# Patient Record
(demographics unavailable — no encounter records)

---

## 2024-12-16 NOTE — ASSESSMENT
[FreeTextEntry1] : called Dr. Prieto to request bw results be faxed over will do bw after review Dr. Prieto

## 2024-12-16 NOTE — HISTORY OF PRESENT ILLNESS
[FreeTextEntry1] :  f/u  [de-identified] :  anxiety - feels sertraline somewhat working but still feels anxious - no si or hi - would like to go up on sertraline  heme - interval - saw Dr. Prieto - increased to Jakafi 5mg in am and Jakafi 10mg in pm but hasn't started yet

## 2024-12-16 NOTE — HISTORY OF PRESENT ILLNESS
[FreeTextEntry1] :  f/u  [de-identified] :  anxiety - feels sertraline somewhat working but still feels anxious - no si or hi - would like to go up on sertraline  heme - interval - saw Dr. Prieto - increased to Jakafi 5mg in am and Jakafi 10mg in pm but hasn't started yet

## 2025-02-04 NOTE — HISTORY OF PRESENT ILLNESS
[FreeTextEntry1] :  depression/anxiety [de-identified] : Depression- taking sertraline 150mg daily - sometimes taking sertraline 200mg daily  no si or hi - doesn't feel like sertraline is helping - takes xanax prn anxiety  feels more down about aging  has good friends  would like to try alternative medicine lexapro didn't help  will rtc for bw

## 2025-02-04 NOTE — HISTORY OF PRESENT ILLNESS
[FreeTextEntry1] :  depression/anxiety [de-identified] : Depression- taking sertraline 150mg daily - sometimes taking sertraline 200mg daily  no si or hi - doesn't feel like sertraline is helping - takes xanax prn anxiety  feels more down about aging  has good friends  would like to try alternative medicine lexapro didn't help  will rtc for bw

## 2025-03-03 NOTE — PHYSICAL EXAM
[No Acute Distress] : no acute distress [Well Nourished] : well nourished [PERRL] : pupils equal round and reactive to light [EOMI] : extraocular movements intact [No Accessory Muscle Use] : no accessory muscle use [Clear to Auscultation] : lungs were clear to auscultation bilaterally [Regular Rhythm] : with a regular rhythm [Normal S1, S2] : normal S1 and S2 [No Murmur] : no murmur heard [No Edema] : there was no peripheral edema [Normal] : the cranial nerves were intact [Sensation Tactile Decrease] : light touch was intact [2+] : left 2+ [Normal Affect] : the affect was normal [Normal Insight/Judgement] : insight and judgment were intact

## 2025-03-03 NOTE — HISTORY OF PRESENT ILLNESS
[FreeTextEntry1] :   f/u  [de-identified] : anxiety/depression - states went up to 2 duloxetine 30mg daily but felt good so went back to 1 duloxetine 30mg daily  states in the past does have diagnosis of ADHD states "sometimes forgets what he did yesterday"   ENDO - here for f/u of elevated prolactin and elevated FSH level   has appt to see hematology tomorrow  pre-diabetes - watching diet  occ LBP with tingling in r. leg - brief episodes and resolves with PT

## 2025-03-17 NOTE — HISTORY OF PRESENT ILLNESS
[FreeTextEntry1] :  f/u  [de-identified] :  had elevated prolactin - repeat wnl  went for MRI pituitary - shows no macro-adenoma   depression/anxiety - on duloxetine 30mg daily - no si or hi  takes xanax prn    is going to see hand doctor for L. palmar ganglion cyst - in the past has had injections every 6 months  MRI pituitary noted incidental finding of 1.9 cm nasal polyp

## 2025-03-17 NOTE — PHYSICAL EXAM
[No Acute Distress] : no acute distress [Well Nourished] : well nourished [No Accessory Muscle Use] : no accessory muscle use [Clear to Auscultation] : lungs were clear to auscultation bilaterally [Regular Rhythm] : with a regular rhythm [Normal S1, S2] : normal S1 and S2 [No Murmur] : no murmur heard [No Edema] : there was no peripheral edema [Normal Affect] : the affect was normal [Normal Insight/Judgement] : insight and judgment were intact

## 2025-04-11 NOTE — PHYSICAL EXAM
[No Acute Distress] : no acute distress [Well Nourished] : well nourished [Regular Rhythm] : with a regular rhythm [Normal S1, S2] : normal S1 and S2 [No Murmur] : no murmur heard [No Edema] : there was no peripheral edema [Soft] : abdomen soft [Non Tender] : non-tender [Non-distended] : non-distended [No Masses] : no abdominal mass palpated [No HSM] : no HSM [Normal Bowel Sounds] : normal bowel sounds [Normal Affect] : the affect was normal [Normal Insight/Judgement] : insight and judgment were intact

## 2025-04-11 NOTE — HISTORY OF PRESENT ILLNESS
[FreeTextEntry1] :   mikel [de-identified] : mikel - pharmacy didn't fill duloxetine prescription "because too early" so has been back on sertraline for 4-5 days no si or hi   in process of making endocrinology appoitnment

## 2025-04-11 NOTE — HISTORY OF PRESENT ILLNESS
[FreeTextEntry1] :   mikel [de-identified] : mikel - pharmacy didn't fill duloxetine prescription "because too early" so has been back on sertraline for 4-5 days no si or hi   in process of making endocrinology appoitnment

## 2025-04-11 NOTE — PHYSICAL EXAM
Cardiology Progress Note - [place] [de-identified] y o  female MRN: [id number]    Unit/Bed#: [facility] 503-01 Encounter: [id number]      Assessment and plan  1  Acute hypoxemic respiratory failure - multifactorial, but felt primarily due to pneumonia/COPD  2  Pneumonia  3  Acute on chronic diastolic congestive heart failure - improving slowly  4  Coronary artery disease history CABG stable without angina  5  Status post dual chamber pacer  6  Hypertension  7  Diabetes  8  Acute kidney injury - improving    Plan:    Continue daily furosemide for gentle diuresis, seems to be effective thus far  I suspect 20 mg of oral furosemide should be adequate for home    She is diuretic naive  Discussed with Dr Benjamin Chi    Subjective:   Patient seen and examined    Dyspnea is better  Denies a cough  Is no palpitations or chest pain    Meds/Allergies   Allergies   Allergen Reactions    Ace Inhibitors     Chlorpromazine     Latex     Lisinopril     Namenda [Memantine]     Penicillins     Propoxyphene     Stadol [Butorphanol]        Current Facility-Administered Medications:     acetaminophen (TYLENOL) tablet 650 mg, 650 mg, Oral, Q6H PRN, Alycia Ganser, MD    amLODIPine (NORVASC) tablet 5 mg, 5 mg, Oral, Daily, Alycia Ganser, MD, 5 mg at 04/25/18 0807    aspirin chewable tablet 81 mg, 81 mg, Oral, Daily, Alycia Ganser, MD, 81 mg at 04/25/18 0806    atorvastatin (LIPITOR) tablet 40 mg, 40 mg, Oral, Daily, Alycia Ganser, MD, 40 mg at 04/25/18 0806    calcium carbonate-vitamin D (OSCAL-D) 500 mg-200 units per tablet 1 tablet, 1 tablet, Oral, BID With Meals, Brian Mcfarlane MD, 1 tablet at 04/25/18 0756    docusate sodium (COLACE) capsule 100 mg, 100 mg, Oral, BID, Alycia Ganser, MD, 100 mg at 04/24/18 0830    enoxaparin (LOVENOX) subcutaneous injection 40 mg, 40 mg, Subcutaneous, Daily, Alycia Ganser, MD, 40 mg at 04/25/18 0807    furosemide (LASIX) injection 40 mg, 40 mg, Intravenous, Daily, Neha Antonio MD, 40 mg at 04/25/18 0807    guaiFENesin (MUCINEX) 12 hr tablet 600 mg, 600 mg, Oral, Q12H [address], Julieth Shearer MD, 600 mg at 04/25/18 0806    insulin glargine (LANTUS) subcutaneous injection 20 Units, 20 Units, Subcutaneous, HS, Brain Mcfarlane MD, 20 Units at 04/24/18 2108    insulin lispro (HumaLOG) 100 units/mL subcutaneous injection 1-6 Units, 1-6 Units, Subcutaneous, TID AC, 3 Units at 04/25/18 1150 **AND** Fingerstick Glucose (POCT), , , TID AC, Julieth Shearer MD    insulin lispro (HumaLOG) 100 units/mL subcutaneous injection 1-6 Units, 1-6 Units, Subcutaneous, HS, Julieth Shearer MD, 2 Units at 04/24/18 2156    insulin lispro (HumaLOG) 100 units/mL subcutaneous injection 10 Units, 10 Units, Subcutaneous, TID With Meals, Rocío Garcia PA-C, 10 Units at 04/25/18 1149    ipratropium (ATROVENT) 0 02 % inhalation solution 0 5 mg, 0 5 mg, Nebulization, Q6H, Brian Mcfarlane MD, 0 5 mg at 04/25/18 0729    isosorbide mononitrate (IMDUR) 24 hr tablet 60 mg, 60 mg, Oral, Daily, Julieth Shearer MD, 60 mg at 04/25/18 0806    levalbuterol (Anita Rouge) inhalation solution 1 25 mg, 1 25 mg, Nebulization, Q6H, Brian Mcfarlane MD, 1 25 mg at 04/25/18 0729    methylPREDNISolone sodium succinate (Solu-MEDROL) injection 40 mg, 40 mg, Intravenous, Q8H [address], Brian Mcfarlane MD, 40 mg at 04/25/18 0519    metoprolol tartrate (LOPRESSOR) tablet 25 mg, 25 mg, Oral, Q12H [address], Julieth Shearer MD, 25 mg at 04/25/18 0806    nitroglycerin (NITROSTAT) SL tablet 0 4 mg, 0 4 mg, Sublingual, Q5 Min PRN, Julieth Shearer MD    ondansetron [facility]) injection 4 mg, 4 mg, Intravenous, Q6H PRN, Julieth Shearer MD    pantoprazole (PROTONIX) EC tablet 40 mg, 40 mg, Oral, Early Morning, Julieth Shearer MD, 40 mg at 04/25/18 0520    polyethylene glycol (MIRALAX) packet 17 g, 17 g, Oral, Daily PRN, Julieth Shearer MD    senna (SENOKOT) tablet 8 6 mg, 1 tablet, Oral, Daily, Jerman Michaels Virginia Musa MD, 8 6 mg at 18 0830    sodium chloride (PF) 0 9 % injection 3 mL, 3 mL, Intravenous, PRN, Sue Méndez MD    Objective   Vitals: Blood pressure 129/61, pulse 61, temperature (!) 96 3 °F (35 7 °C), temperature source Axillary, resp  rate 18, height 5' 8" (1 727 m), weight 107 kg (236 lb 5 3 oz), SpO2 95 %  , Body mass index is 35 93 kg/m² , Orthostatic Blood Pressures    Flowsheet Row Most Recent Value   Blood Pressure  129/61 filed at 2018 1100   Patient Position - Orthostatic VS  Sitting filed at 2018 9930        Systolic (51CBY), BHV:750 , Min:120 , UE     Diastolic (82FMX), HLP:11, Min:58, Max:64      Intake/Output Summary (Last 24 hours) at 18 1238  Last data filed at 18 1035   Gross per 24 hour   Intake              365 ml   Output             1388 ml   Net            -1023 ml     Weight (last 2 days)     Date/Time   Weight    18 0448  107 (236 33)    18 0600  107 (236 99)    18 0110  106 (233 03)              No significant arrhythmias seen on telemetry review       Physical Exam:    GEN: Marshal Leung appears well, alert and oriented x 3, pleasant and cooperative   HEENT: pupils equal, round, and reactive to light; extraocular muscles intact  NECK: supple, no carotid bruits, minimal JVD, slight HJR  HEART: normal rate, regular rhythm, normal S1 and S2, no murmurs  LUNGS: clear to auscultation bilaterally; no wheezes, rales, or rhonchi   ABDOMEN: normal bowel sounds, soft, no tenderness, no distention  EXTREMITIES: peripheral pulses normal; no clubbing, cyanosis, 1+ edema LE      Laboratory Results:    Results from last 7 days  Lab Units 18  0457 18  2315   TROPONIN I ng/mL <0 02 <0 02       CBC with diff:   Results from last 7 days  Lab Units 18  0518 18  0651 18  2325 18  2315   WBC Thousand/uL 10 19* 9 39  --  7 12   HEMOGLOBIN g/dL 12 5 12 1  --  12 6   I STAT HEMOGLOBIN g/dl  --   --  13 3  -- [No Acute Distress] : no acute distress HEMATOCRIT % 36 9 37 8  --  38 5   MCV fL 88 91  --  89   PLATELETS Thousands/uL 205 181  --  196   MCH pg 29 8 29 0  --  29 0   MCHC g/dL 33 9 32 0  --  32 7   RDW % 15 4* 15 6*  --  15 5*   MPV fL 12 8* 11 5  --  11 1   NRBC AUTO /100 WBCs  --  0  --  0         CMP:  Results from last 7 days  Lab Units 04/25/18 0433 04/24/18  1110 04/24/18 0518 04/23/18 0457 04/22/18  2325 04/22/18  2315   SODIUM mmol/L 140 136 134* 139  --  139   POTASSIUM mmol/L 4 2 4 2 5 8* 4 3  --  4 1   CHLORIDE mmol/L 107 101 102 106  --  105   CO2 mmol/L 24 26 24 24  --  28   ANION GAP mmol/L 9 9 8 9  --  6   BUN mg/dL 47* 41* 39* 33*  --  29*   CREATININE mg/dL 1 31* 1 54* 1 38* 1 23  --  1 20   GLUCOSE RANDOM mg/dL 183* 255* 236* 301*  --  249*   GLUCOSE, ISTAT mg/dl  --   --   --   --  267*  --    CALCIUM mg/dL 8 9 9 5 9 2 8 9  --  9 4   AST U/L  --   --   --   --   --  20   ALT U/L  --   --   --   --   --  27   ALK PHOS U/L  --   --   --   --   --  114   TOTAL PROTEIN g/dL  --   --   --   --   --  7 3   BILIRUBIN TOTAL mg/dL  --   --   --   --   --  0 69   EGFR ml/min/1 73sq m 38 32 36 42  --  43         BMP:  Results from last 7 days  Lab Units 04/25/18 0433 04/24/18  1110 04/24/18 0518 04/23/18 0457 04/22/18  2315   SODIUM mmol/L 140 136 134* 139  --  139   POTASSIUM mmol/L 4 2 4 2 5 8* 4 3  --  4 1   CHLORIDE mmol/L 107 101 102 106  --  105   CO2 mmol/L 24 26 24 24  --  28   BUN mg/dL 47* 41* 39* 33*  --  29*   CREATININE mg/dL 1 31* 1 54* 1 38* 1 23  --  1 20   GLUCOSE RANDOM mg/dL 183* 255* 236* 301*  --  249*   GLUCOSE, ISTAT   --   --   --   --   < >  --    CALCIUM mg/dL 8 9 9 5 9 2 8 9  --  9 4   < > = values in this interval not displayed      Magnesium:   Results from last 7 days  Lab Units 04/25/18  0433   MAGNESIUM mg/dL 2 5       Coags:   Results from last 7 days  Lab Units 04/22/18  2315   PTT seconds 34   INR  1 05       Hemoglobin A1C )  Results from last 7 days  Lab Units 04/23/18  0457   HEMOGLOBIN A1C % 8 7* [Well Nourished] : well nourished Cardiac testing:   Results for orders placed during the hospital encounter of 18   Echo complete with contrast if indicated    Narrative [address]  [facility], [address] [facility]  [phone]    Transthoracic Echocardiogram  2D, M-mode, Doppler, and Color Doppler    Study date:  2018    Patient: Shelby Essex  MR number: [id number]  Account number: [de-identified]  : 1937  Age: [de-identified] years  Gender: Female  Status: Inpatient  Location: Echo lab  Height: 68 in  Weight: 232 5 lb  BP: 130/ 62 mmHg    Indications: Dyspnea    Diagnoses: R06 00 - Dyspnea, unspecified    Primary Physician:  Chad Thornton MD  Referring Physician:  Andrea Alamo MD  Group:  [facility]  Cardiology Fellow:  Yumi Angel MD  Interpreting Physician:  Rhonda Torres MD    SUMMARY    LEFT VENTRICLE:  Systolic function was normal  Ejection fraction was estimated to be 60 %  There were no regional wall motion abnormalities  There was no evidence of concentric hypertrophy  Features were consistent with a pseudonormal left ventricular filling pattern, with concomitant abnormal relaxation and increased filling pressure (grade 2 diastolic dysfunction)  RIGHT VENTRICLE:  The ventricle was mildly to moderately dilated  LEFT ATRIUM:  The atrium was mildly dilated  RIGHT ATRIUM:  The atrium was mildly to moderately dilated  TRICUSPID VALVE:  There was moderate to severe regurgitation  Regurgitation grade was 3-4+ on a scale of 0 to 4+  IVC, HEPATIC VEINS:  The inferior vena cava was dilated  Respirophasic changes were blunted (less than 50% variation)  There is systolic flow reversal in the hepatic vein consistent with severe tricuspid regurgitation  COMPARISONS:  Comparison was made with the previous study of 25-May-2014  Tricuspid regurgitation has worsened      HISTORY: PRIOR HISTORY: HTN, HLD, DM, Dementia    PROCEDURE: The [Regular Rhythm] : with a regular rhythm [Normal S1, S2] : normal S1 and S2 procedure was performed in the echo lab  This was a routine study  The transthoracic approach was used  The study included complete 2D imaging, M-mode, complete spectral Doppler, and color Doppler  The heart rate was 63 bpm,  at the start of the study  Images were obtained from the parasternal, apical, subcostal, and suprasternal notch acoustic windows  Echocardiographic views were limited due to decreased penetration and lung interference  This was a  technically difficult study  LEFT VENTRICLE: Size was normal  Systolic function was normal  Ejection fraction was estimated to be 60 %  There were no regional wall motion abnormalities  Wall thickness was normal  There was no evidence of concentric hypertrophy  DOPPLER: Features were consistent with a pseudonormal left ventricular filling pattern, with concomitant abnormal relaxation and increased filling pressure (grade 2 diastolic dysfunction)  VENTRICULAR SEPTUM: There was systolic and diastolic flattening  These changes are consistent with RV volume and pressure overload  RIGHT VENTRICLE: The ventricle was mildly to moderately dilated  Systolic function was low normal  A pacing wire was present  LEFT ATRIUM: The atrium was mildly dilated  RIGHT ATRIUM: The atrium was mildly to moderately dilated  MITRAL VALVE: Valve structure was normal  There was normal leaflet separation  DOPPLER: The transmitral velocity was within the normal range  There was no evidence for stenosis  There was no significant regurgitation  AORTIC VALVE: The valve was trileaflet  Leaflets exhibited normal thickness, normal cuspal separation, and sclerosis  DOPPLER: Transaortic velocity was within the normal range  There was no evidence for stenosis  There was no  regurgitation  TRICUSPID VALVE: DOPPLER: The transtricuspid velocity was within the normal range  There was no evidence for stenosis  There was moderate to severe regurgitation   Regurgitation grade was 3-4+ on [No Murmur] : no murmur heard a scale of 0 to 4+  PULMONIC VALVE: DOPPLER: The transpulmonic velocity was within the normal range  There was no evidence for stenosis  There was trace regurgitation  PERICARDIUM: There was no pericardial effusion  AORTA: The root exhibited normal size  SYSTEMIC VEINS: IVC: The inferior vena cava was dilated  Respirophasic changes were blunted (less than 50% variation)  HEPATIC VEIN DOPPLER: There is systolic flow reversal in the hepatic vein consistent with severe tricuspid  regurgitation  SYSTEM MEASUREMENT TABLES    2D  %FS: 20 05 %  Ao Diam: 3 36 cm  EDV(Teich): 85 57 ml  EF(Teich): 41 31 %  ESV(Teich): 50 22 ml  IVSd: 1 03 cm  LA Area: 21 37 cm2  LA Diam: 3 37 cm  LVEDV MOD A4C: 68 81 ml  LVEF MOD A4C: 56 86 %  LVESV MOD A4C: 29 69 ml  LVIDd: 4 35 cm  LVIDs: 3 48 cm  LVLd A4C: 7 37 cm  LVLs A4C: 6 82 cm  LVOT Diam: 1 88 cm  LVPWd: 0 97 cm  RA Area: 21 18 cm2  RVIDd: 4 1 cm  SV MOD A4C: 39 12 ml  SV(Teich): 35 35 ml    CW  AV Env  Ti: 359 86 ms  AV VTI: 52 93 cm  AV Vmax: 2 03 m/s  AV Vmax: 2 24 m/s  AV Vmean: 1 47 m/s  AV maxP 42 mmHg  AV maxP 15 mmHg  AV meanPG: 10 27 mmHg  TR Vmax: 2 1 m/s  TR maxP 7 mmHg    MM  TAPSE: 1 73 cm    PW  NESTOR (VTI): 1 28 cm2  NESTOR Vmax: 1 31 cm2  NESTOR Vmax: 1 46 cm2  NESTOR Vmax, Pt: 1 21 cm2  NESTOR Vmax, Pt: 1 35 cm2  E': 0 08 m/s  E/E': 14 35  LVOT Env  Ti: 346 02 ms  LVOT VTI: 24 27 cm  LVOT Vmax: 0 98 m/s  LVOT Vmax: 1 06 m/s  LVOT Vmean: 0 7 m/s  LVOT maxPG: 3 84 mmHg  LVOT maxP 49 mmHg  LVOT meanP 32 mmHg  LVSI Dopp: 30 98 ml/m2  LVSV Dopp: 67 55 ml  MV A Carroll: 0 8 m/s  MV Dec [place]: 4 21 m/s2  MV DecT: 261 58 ms  MV E Carroll: 1 1 m/s  MV E/A Ratio: 1 38  MV PHT: 75 86 ms  MVA By PHT: 2 9 cm2    [employer] Accredited Echocardiography [facility]    Prepared and electronically signed by    Cedric Triplett MD  Signed 2018 17:07:00       No results found for this or any previous visit    No results found for this or any previous [No Edema] : there was no peripheral edema visit  No results found for this or any previous visit  Results for orders placed in visit on 05/16/14   NM myocardial perfusion spect (stress and/or rest)    Narrative PHARMACOLOGIC STRESS TEST, LEXISCAN          INDICATION-  Chest pain, shortness of breath  Prior history of CABG   and stent placement   COMPARISON- No prior studies,   TECHNIQUE-  Pharmacologic stress testing was performed utilizing   [employer]  SPECT myocardial perfusion images was performed  Dosages of   TC-99-mm Myoview were 10 6 mCi and 33 0 mCi IV  Both rest and stress   images were performed  Images were then reconstructed in the short,   vertical and horizontal long axes projections  Baseline heart rate 61 beats per minute  Peak heart of 78 beats per minute  Baseline blood pressure 134/77 mmHg  Peak blood pressure 145/71 mmHg  Symptoms-   Shortness of breath nausea chest pain recovery  Time to peak imaging for rest 55 minutes and stress 33 minutes  Please see cardiology report of physiologic data  FINDINGS-   OVERALL QUALITY-   Acceptable  ARTIFACT-  Breast attenuation   PERFUSION IMAGES-   SPECT images showed a large region of mildly   diminished perfusion within the anterior wall which was normal at rest   compatible with a region of ischemia  There are no other abnormal   regions of diminished perfusion  Left ventricular cavity was normal in   size  It does appear to increase with stress  WALL MOTION-  Normal contractility   EJECTION FRACTION-  66 %   IMPRESSION-   1  Region of ischemia seen in the anterior wall   2   Left ventricular ejection fraction- 66%   ##imslh##imslh   Transcribed on- [id number]           - BYRON Fregoso MD        Reading RadiologistBYRON Wilde MD        Releasing RadiologistBYRON Wilde MD        Released Date Time- 05/16/14 1527      ------------------------------------------------------------------------------   Eva Irby Yon Parra MD    Portions of the record may have been created with voice recognition software   Occasional wrong word or "sound a like" substitutions may have occurred due to the inherent limitations of voice recognition software   Read the chart carefully and recognize, using context, where substitutions have occurred  [Soft] : abdomen soft [Non Tender] : non-tender [Non-distended] : non-distended [No Masses] : no abdominal mass palpated [No HSM] : no HSM [Normal Bowel Sounds] : normal bowel sounds [Normal Affect] : the affect was normal [Normal Insight/Judgement] : insight and judgment were intact

## 2025-04-25 NOTE — HISTORY OF PRESENT ILLNESS
[FreeTextEntry1] :  f/u  [de-identified] :  anxiety - taking duloxetine  has not made appointment to see psychologist yet  saw hand doctor - got injection in L. hand - going for PT

## 2025-04-25 NOTE — HISTORY OF PRESENT ILLNESS
[FreeTextEntry1] :  f/u  [de-identified] :  anxiety - taking duloxetine  has not made appointment to see psychologist yet  saw hand doctor - got injection in L. hand - going for PT

## 2025-04-25 NOTE — ASSESSMENT
[FreeTextEntry1] : d/w pt - he has an appointment to see an endocrinologist Dr. Humphreys for consultation on May 20 @10am.   f/u in 2-3 months

## 2025-05-20 NOTE — ASSESSMENT
[FreeTextEntry1] : Patient repeat prolactin level done in March 2025 was normal so he has a normal prolactin level. In addition, the elevated prolactin level in Feb 2025 was done at 4 pm so this is not the ideal time to collect prolactin levels.  Patient's FSH is elevated but his total and free testosterone level in Feb 2025 were normal so I will monitor this.  I WILL CONSIDER ORDERED A SCROTAL US IN THE FUTURE.  Patient's MRI pituitary gland in March 2025 revealed some non-specific changes but there are no pituitary macro or microadenoma. No indication for repeat MRI pituitary gland.  Plan: 1. Labs to be done in 6 months - 8 am to 10 am - fasting - see below 2. Follow up in 6 months to review results Statement Selected

## 2025-05-20 NOTE — PHYSICAL EXAM
[de-identified] : General: No distress, well nourished Eyes: Normal Sclera, EOMI, PERRL ENT: Normal appearance of the nose, normal oropharynx Neck/Thyroid: No cervical lymphadenopathy, thyroid gland 20 g in size, no thyroid nodules, non-tender Respiratory: No use of accessory muscles of respiration, vesicular breath sounds heard bilaterally, no crepitations or rhonchi Cardiovascular: S1 and S2 heard and normal, no S3 or S4, no murmurs, radial pulse normal bilaterally Abdomen: soft, non-tender, no masses, normal bowel sounds Musculoskeletal: No swelling or deformities of joints of hands, no pedal edema Neurological: Normal range of motion in the hands, Normal brachioradialis reflexes bilaterally Psychiatry: Patient converses normally, good judgement and insight Skin: No rashes in hands, no nodules palpated in hands

## 2025-05-20 NOTE — HISTORY OF PRESENT ILLNESS
[FreeTextEntry1] : Problems: 1. Abnormal pituitary MRI 2. Intermittent prolactin elevation 3. Elevated FSH level  Abnormal pituitary MRI/Intermittent prolactin elevation/Elevated FSH level 1. In Feb 2025, prolactin level was mildly elevated but this was done at 4:40 pm. Repeat prolactin level at 8:28 am in March 2025 was normal. 2. Labs: March 2024 - FSH 20 (elevated) 02/14/2025 - prolactin 24.9 (4.1 to 18.4), Cr N, total testosterone 665 N, free Testosterone 6.1 N, FSH 14 (elevated) 03/10/2025 - prolactin 13.3 N, monomeric prolactin N, macroprolactin 16%, LH N, FSH 13.1 (1.5 to 12.5), ACTH N, IGF protein 1 N 3. Radiology: 03/11/2025 - MRI pituitary gland - no pituitary macroadenoma or well-circumscribed microadenoma depicted. There is heterogeneously within a small adenohypophysis which is nonspecific. 4. No family history of pituitary disorders 5. Patient is on no medications that cause hyperprolactinemia

## 2025-07-16 NOTE — HISTORY OF PRESENT ILLNESS
[FreeTextEntry8] : cc: periphera neuropathy  c/o of longstanding intermittent peripheral neuropathy in b/l feet  pt states more noticeable - has been wearing shoes without arch support   dog recently  - taking alprzolam prn with relief no si or hi   needs renwal of [facility]

## 2025-07-16 NOTE — PHYSICAL EXAM
[No Acute Distress] : no acute distress [Well Nourished] : well nourished [No Respiratory Distress] : no respiratory distress  [No Accessory Muscle Use] : no accessory muscle use [No Edema] : there was no peripheral edema [Normal Affect] : the affect was normal [Normal Insight/Judgement] : insight and judgment were intact